# Patient Record
Sex: FEMALE | Race: WHITE | NOT HISPANIC OR LATINO | URBAN - METROPOLITAN AREA
[De-identification: names, ages, dates, MRNs, and addresses within clinical notes are randomized per-mention and may not be internally consistent; named-entity substitution may affect disease eponyms.]

---

## 2017-03-08 ENCOUNTER — IMPORTED ENCOUNTER (OUTPATIENT)
Dept: URBAN - METROPOLITAN AREA CLINIC 50 | Facility: CLINIC | Age: 67
End: 2017-03-08

## 2017-07-12 ENCOUNTER — IMPORTED ENCOUNTER (OUTPATIENT)
Dept: URBAN - METROPOLITAN AREA CLINIC 50 | Facility: CLINIC | Age: 67
End: 2017-07-12

## 2017-11-13 ENCOUNTER — IMPORTED ENCOUNTER (OUTPATIENT)
Dept: URBAN - METROPOLITAN AREA CLINIC 50 | Facility: CLINIC | Age: 67
End: 2017-11-13

## 2018-03-19 ENCOUNTER — IMPORTED ENCOUNTER (OUTPATIENT)
Dept: URBAN - METROPOLITAN AREA CLINIC 50 | Facility: CLINIC | Age: 68
End: 2018-03-19

## 2018-07-16 ENCOUNTER — IMPORTED ENCOUNTER (OUTPATIENT)
Dept: URBAN - METROPOLITAN AREA CLINIC 50 | Facility: CLINIC | Age: 68
End: 2018-07-16

## 2018-11-19 ENCOUNTER — IMPORTED ENCOUNTER (OUTPATIENT)
Dept: URBAN - METROPOLITAN AREA CLINIC 50 | Facility: CLINIC | Age: 68
End: 2018-11-19

## 2018-12-17 ENCOUNTER — IMPORTED ENCOUNTER (OUTPATIENT)
Dept: URBAN - METROPOLITAN AREA CLINIC 50 | Facility: CLINIC | Age: 68
End: 2018-12-17

## 2018-12-17 NOTE — PATIENT DISCUSSION
"""Reassured patient that intraocular pressures are at target levels and other ocular findings are ""

## 2019-03-18 ENCOUNTER — IMPORTED ENCOUNTER (OUTPATIENT)
Dept: URBAN - METROPOLITAN AREA CLINIC 50 | Facility: CLINIC | Age: 69
End: 2019-03-18

## 2019-03-18 NOTE — PATIENT DISCUSSION
"""iop stable off timolol.  continue travatan qhs ou and recheck iop in 3 month early am or lunch ""

## 2019-04-15 ENCOUNTER — IMPORTED ENCOUNTER (OUTPATIENT)
Dept: URBAN - METROPOLITAN AREA CLINIC 50 | Facility: CLINIC | Age: 69
End: 2019-04-15

## 2019-04-15 PROBLEM — Z98.890: Noted: 2019-04-15

## 2019-04-24 ENCOUNTER — IMPORTED ENCOUNTER (OUTPATIENT)
Dept: URBAN - METROPOLITAN AREA CLINIC 50 | Facility: CLINIC | Age: 69
End: 2019-04-24

## 2019-06-26 ENCOUNTER — IMPORTED ENCOUNTER (OUTPATIENT)
Dept: URBAN - METROPOLITAN AREA CLINIC 50 | Facility: CLINIC | Age: 69
End: 2019-06-26

## 2019-08-13 NOTE — PATIENT DISCUSSION
Surgery  Counseling: I have discussed the option of glasses versus cataract surgery versus following. It was explained that when vision no longer meets the patient's visual needs and a new prescription for glasses is not likely to improve the patient's visual symptoms, the option of cataract surgery is a reasonable next step. It was explained that there is no guarantee that removing the cataract will improve their visual symptoms, however; it is believed that the cataract is contributing to the patient's visual impairment and surgery may significantly improve both the visual and functional status of the patient. The risks, benefits and alternatives of surgery were discussed with the patient. After this discussion, the patient desires to proceed with cataract surgery with implantation of an intraocular lens to improve vision for fishing and watching tv.

## 2019-08-13 NOTE — PATIENT DISCUSSION
DROPS: BRAND NAME ONLY TO DECREASE RISK OF POST OPERATIVE SWELLING AND TO AID IN THE HEALING PROCESS POST OPERATIVELY.  START BRND NAME DROPS 1 WEEK PRIOR TO SURGERY

## 2019-08-13 NOTE — PATIENT DISCUSSION
S/P RETINAL SURGERY OS- CLEARED FOR CATARACT SURGERY BY DR. ACUÑA. PT ADVISED OF HIGH RISK SURGERY. POOR DILATION AND MACULAR EDEMA INCREASES RISK OF PROBLEMS DURING CATARACT SURGERY. ADV PATIENT DR. ACUÑA MAY HAVE TO PERFORM A SECONDARY SURGERY IF COMPLICATIONS ARISE. THE PATIENT UNDERSTANDS AND DESIRES TO PROCEED.

## 2019-08-28 NOTE — PATIENT DISCUSSION
CONJUNCTIVITIS (ACUTE), OD- PRESCRIBE MAXITROL 1 GTT TID OD X 7 DAYS. DISC GOOD HYGIENE TO AVOID SPREAD/RECURRENCE. F/U AS SCHED OR SOONER IF SYMPTOMS WORSEN.

## 2019-08-28 NOTE — PATIENT DISCUSSION
New Prescription: Maxitrol (neomycin-polymyxin-dexameth): drops,suspension: 3.5-10,000-0.1 mg/mL-unit/mL-% 1 drop three times a day as directed into right eye 08-

## 2019-09-10 NOTE — PATIENT DISCUSSION
CONJUNCTIVITIS (ACUTE), OD - RESOLVED AND NO INDICATION OF INFECTION MOVING INTO LEFT EYE. OK TO PROCEED WITH CATARACT SURGERY IN THE LEFT EYE.

## 2019-09-10 NOTE — PATIENT DISCUSSION
Continue: Maxitrol (neomycin-polymyxin-dexameth): drops,suspension: 3.5-10,000-0.1 mg/mL-unit/mL-% 1 drop three times a day as directed into right eye 08-

## 2019-09-10 NOTE — PATIENT DISCUSSION
DROPS: BRAND NAME ONLY TO DECREASE RISK OF POST OPERATIVE SWELLING AND TO AID IN THE HEALING PROCESS POST OPERATIVELY. START BRAND NAME DROPS 1 WEEK PRIOR TO SURGERY.

## 2019-09-16 ENCOUNTER — IMPORTED ENCOUNTER (OUTPATIENT)
Dept: URBAN - METROPOLITAN AREA CLINIC 50 | Facility: CLINIC | Age: 69
End: 2019-09-16

## 2019-09-26 NOTE — PATIENT DISCUSSION
S/P PC IOL, OS-  DOING WELL AND STABLE. CONTINUE DROPS AS DIRECTED. FOLLOW UP WITH REFERRING PHYSICIAN.

## 2020-01-22 ENCOUNTER — IMPORTED ENCOUNTER (OUTPATIENT)
Dept: URBAN - METROPOLITAN AREA CLINIC 50 | Facility: CLINIC | Age: 70
End: 2020-01-22

## 2021-02-04 ENCOUNTER — IMPORTED ENCOUNTER (OUTPATIENT)
Dept: URBAN - METROPOLITAN AREA CLINIC 50 | Facility: CLINIC | Age: 71
End: 2021-02-04

## 2021-02-04 NOTE — PATIENT DISCUSSION
"""Follow drusen without treatment. Call if vision or distortion increases.  Recommend dark oleafy ""

## 2021-04-18 ASSESSMENT — VISUAL ACUITY
OD_CC: J1+
OS_CC: J1+
OD_CC: 20/20
OD_CC: 20/20
OD_CC: J1+
OD_PH: 20/30
OS_CC: 20/20-1
OS_CC: 20/20
OS_BAT: 20/25
OS_CC: 20/25
OD_CC: 20/25-3
OS_CC: 20/25
OD_CC: 20/20
OS_CC: J1+
OS_CC: 20/25
OD_OTHER: 20/40. 20/40.
OS_CC: 20/25-
OD_CC: J1+
OD_CC: 20/30-3
OS_OTHER: 20/25. 20/25.
OD_BAT: 20/30
OD_CC: 20/20
OD_OTHER: 20/25. 20/30.
OD_CC: 20/30-
OS_CC: 20/25+2
OS_CC: J1+
OD_BAT: 20/25
OD_CC: J1+
OS_CC: 20/20
OD_CC: 20/30+/-
OS_CC: 20/20
OS_CC: 20/20
OD_BAT: 20/25
OD_CC: 20/20
OD_CC: 20/20-1
OD_OTHER: 20/30. 20/40.
OD_OTHER: 20/25. 20/30.
OD_CC: 20/20
OD_CC: 20/25-2
OD_CC: 20/20-1
OD_BAT: 20/40
OS_CC: J1+
OS_CC: 20/25+2
OD_PH: 20/25
OD_CC: 20/20
OD_CC: J1+
OS_CC: J1+

## 2021-04-18 ASSESSMENT — TONOMETRY
OS_IOP_MMHG: 20
OD_IOP_MMHG: 18
OS_IOP_MMHG: 19
OD_IOP_MMHG: 19
OS_IOP_MMHG: 18
OD_IOP_MMHG: 17
OD_IOP_MMHG: 21
OD_IOP_MMHG: 19
OD_IOP_MMHG: 17
OS_IOP_MMHG: 20
OS_IOP_MMHG: 17
OD_IOP_MMHG: 17
OS_IOP_MMHG: 19
OS_IOP_MMHG: 17
OS_IOP_MMHG: 17
OD_IOP_MMHG: 17
OS_IOP_MMHG: 20
OS_IOP_MMHG: 18
OD_IOP_MMHG: 17
OS_IOP_MMHG: 18
OD_IOP_MMHG: 17
OS_IOP_MMHG: 23
OD_IOP_MMHG: 18
OS_IOP_MMHG: 19
OD_IOP_MMHG: 19
OS_IOP_MMHG: 22
OS_IOP_MMHG: 21
OS_IOP_MMHG: 15
OD_IOP_MMHG: 20
OS_IOP_MMHG: 21
OD_IOP_MMHG: 21
OS_IOP_MMHG: 18
OS_IOP_MMHG: 20
OD_IOP_MMHG: 18
OD_IOP_MMHG: 18
OD_IOP_MMHG: 20
OS_IOP_MMHG: 21
OD_IOP_MMHG: 18
OS_IOP_MMHG: 20
OD_IOP_MMHG: 18
OS_IOP_MMHG: 19
OD_IOP_MMHG: 16
OS_IOP_MMHG: 15
OS_IOP_MMHG: 17
OS_IOP_MMHG: 18
OS_IOP_MMHG: 20
OD_IOP_MMHG: 18
OD_IOP_MMHG: 20
OD_IOP_MMHG: 18
OS_IOP_MMHG: 22
OD_IOP_MMHG: 22
OD_IOP_MMHG: 17
OD_IOP_MMHG: 19
OD_IOP_MMHG: 17
OS_IOP_MMHG: 16
OD_IOP_MMHG: 18
OD_IOP_MMHG: 19

## 2021-04-18 ASSESSMENT — PACHYMETRY
OS_CT_UM: 579
OD_CT_UM: 564
OS_CT_UM: 579
OD_CT_UM: 564
OS_CT_UM: 579
OD_CT_UM: 564
OS_CT_UM: 579
OD_CT_UM: 564
OD_CT_UM: 564
OS_CT_UM: 579
OD_CT_UM: 564
OS_CT_UM: 579
OD_CT_UM: 564
OD_CT_UM: 564
OS_CT_UM: 579
OD_CT_UM: 564
OD_CT_UM: 564
OS_CT_UM: 579
OS_CT_UM: 579
OD_CT_UM: 564
OS_CT_UM: 579

## 2022-02-01 ENCOUNTER — PREPPED CHART (OUTPATIENT)
Dept: URBAN - METROPOLITAN AREA CLINIC 53 | Facility: CLINIC | Age: 72
End: 2022-02-01

## 2022-02-17 ENCOUNTER — COMPREHENSIVE EXAM (OUTPATIENT)
Dept: URBAN - METROPOLITAN AREA CLINIC 53 | Facility: CLINIC | Age: 72
End: 2022-02-17

## 2022-02-17 DIAGNOSIS — D31.31: ICD-10-CM

## 2022-02-17 DIAGNOSIS — H57.11: ICD-10-CM

## 2022-02-17 DIAGNOSIS — H35.363: ICD-10-CM

## 2022-02-17 DIAGNOSIS — H35.371: ICD-10-CM

## 2022-02-17 PROCEDURE — 92134 CPTRZ OPH DX IMG PST SGM RTA: CPT

## 2022-02-17 PROCEDURE — 92014 COMPRE OPH EXAM EST PT 1/>: CPT

## 2022-02-17 ASSESSMENT — VISUAL ACUITY
OU_CC: J1+@17"
OD_CC: 20/20
OS_CC: 20/20

## 2022-02-17 ASSESSMENT — TONOMETRY
OS_IOP_MMHG: 20
OD_IOP_MMHG: 20
OD_IOP_MMHG: 19
OS_IOP_MMHG: 18

## 2023-05-11 ENCOUNTER — COMPREHENSIVE EXAM (OUTPATIENT)
Dept: URBAN - METROPOLITAN AREA CLINIC 53 | Facility: CLINIC | Age: 73
End: 2023-05-11

## 2023-05-11 DIAGNOSIS — H35.363: ICD-10-CM

## 2023-05-11 DIAGNOSIS — D31.31: ICD-10-CM

## 2023-05-11 DIAGNOSIS — H35.371: ICD-10-CM

## 2023-05-11 PROCEDURE — 92014 COMPRE OPH EXAM EST PT 1/>: CPT

## 2023-05-11 PROCEDURE — 92134 CPTRZ OPH DX IMG PST SGM RTA: CPT

## 2023-05-11 ASSESSMENT — VISUAL ACUITY
OD_CC: 20/20
OS_CC: 20/20-1
OU_CC: J1+ @ 16IN
OU_CC: 20/20-1

## 2023-05-11 ASSESSMENT — TONOMETRY
OS_IOP_MMHG: 19
OD_IOP_MMHG: 20
OD_IOP_MMHG: 21
OS_IOP_MMHG: 21

## 2024-05-09 ENCOUNTER — COMPREHENSIVE EXAM (OUTPATIENT)
Dept: URBAN - METROPOLITAN AREA CLINIC 53 | Facility: CLINIC | Age: 74
End: 2024-05-09

## 2024-05-09 DIAGNOSIS — H35.363: ICD-10-CM

## 2024-05-09 DIAGNOSIS — D31.31: ICD-10-CM

## 2024-05-09 DIAGNOSIS — H35.372: ICD-10-CM

## 2024-05-09 DIAGNOSIS — H35.362: ICD-10-CM

## 2024-05-09 DIAGNOSIS — H53.8: ICD-10-CM

## 2024-05-09 DIAGNOSIS — H52.4: ICD-10-CM

## 2024-05-09 PROCEDURE — 92015 DETERMINE REFRACTIVE STATE: CPT

## 2024-05-09 PROCEDURE — 99214 OFFICE O/P EST MOD 30 MIN: CPT

## 2024-05-09 PROCEDURE — 92134 CPTRZ OPH DX IMG PST SGM RTA: CPT

## 2024-05-09 ASSESSMENT — VISUAL ACUITY
OU_CC: 20/20
OS_CC: 20/20-2
OU_CC: J1+@14"
OD_CC: J1+@14"
OS_CC: J1+@14"
OD_CC: 20/20-1

## 2024-05-09 ASSESSMENT — KERATOMETRY
OD_K1POWER_DIOPTERS: 43.00
OD_AXISANGLE2_DEGREES: 28
OS_K1POWER_DIOPTERS: 41.75
OS_AXISANGLE_DEGREES: 076
OD_K2POWER_DIOPTERS: 44.00
OS_K2POWER_DIOPTERS: 45.50
OS_AXISANGLE2_DEGREES: 166
OD_AXISANGLE_DEGREES: 118

## 2024-05-09 ASSESSMENT — TONOMETRY
OD_IOP_MMHG: 20
OD_IOP_MMHG: 19
OS_IOP_MMHG: 19
OS_IOP_MMHG: 21

## 2025-05-20 ENCOUNTER — COMPREHENSIVE EXAM (OUTPATIENT)
Age: 75
End: 2025-05-20

## 2025-05-20 DIAGNOSIS — D31.31: ICD-10-CM

## 2025-05-20 DIAGNOSIS — H35.362: ICD-10-CM

## 2025-05-20 DIAGNOSIS — S09.8XXS: ICD-10-CM

## 2025-05-20 DIAGNOSIS — H52.4: ICD-10-CM

## 2025-05-20 DIAGNOSIS — H35.372: ICD-10-CM

## 2025-05-20 PROCEDURE — 99214 OFFICE O/P EST MOD 30 MIN: CPT

## 2025-05-20 PROCEDURE — 92015 DETERMINE REFRACTIVE STATE: CPT
